# Patient Record
Sex: FEMALE | Race: WHITE | URBAN - METROPOLITAN AREA
[De-identification: names, ages, dates, MRNs, and addresses within clinical notes are randomized per-mention and may not be internally consistent; named-entity substitution may affect disease eponyms.]

---

## 2017-06-15 ENCOUNTER — IMPORTED ENCOUNTER (OUTPATIENT)
Dept: URBAN - METROPOLITAN AREA CLINIC 50 | Facility: CLINIC | Age: 67
End: 2017-06-15

## 2017-07-27 ENCOUNTER — IMPORTED ENCOUNTER (OUTPATIENT)
Dept: URBAN - METROPOLITAN AREA CLINIC 50 | Facility: CLINIC | Age: 67
End: 2017-07-27

## 2018-08-22 ENCOUNTER — IMPORTED ENCOUNTER (OUTPATIENT)
Dept: URBAN - METROPOLITAN AREA CLINIC 50 | Facility: CLINIC | Age: 68
End: 2018-08-22

## 2018-08-22 NOTE — PATIENT DISCUSSION
""""" ""Informed patient that their cataract is visually significant and meets the criteria for cataract surgery to increase their vision and decrease their glare symptoms.  RBALs of surgery discussed

## 2019-08-21 ENCOUNTER — IMPORTED ENCOUNTER (OUTPATIENT)
Dept: URBAN - METROPOLITAN AREA CLINIC 50 | Facility: CLINIC | Age: 69
End: 2019-08-21

## 2020-08-27 ENCOUNTER — IMPORTED ENCOUNTER (OUTPATIENT)
Dept: URBAN - METROPOLITAN AREA CLINIC 50 | Facility: CLINIC | Age: 70
End: 2020-08-27

## 2020-10-16 ENCOUNTER — IMPORTED ENCOUNTER (OUTPATIENT)
Dept: URBAN - METROPOLITAN AREA CLINIC 50 | Facility: CLINIC | Age: 70
End: 2020-10-16

## 2020-11-11 ENCOUNTER — IMPORTED ENCOUNTER (OUTPATIENT)
Dept: URBAN - METROPOLITAN AREA CLINIC 50 | Facility: CLINIC | Age: 70
End: 2020-11-11

## 2020-11-19 ENCOUNTER — IMPORTED ENCOUNTER (OUTPATIENT)
Dept: URBAN - METROPOLITAN AREA CLINIC 50 | Facility: CLINIC | Age: 70
End: 2020-11-19

## 2020-11-19 NOTE — PATIENT DISCUSSION
"""S/P IOL OD: Tecnis ZCB00 22.0 (Target: [place]) +Omidria. Continue post operative instructions and drops per schedule.  """

## 2020-11-25 ENCOUNTER — IMPORTED ENCOUNTER (OUTPATIENT)
Dept: URBAN - METROPOLITAN AREA CLINIC 50 | Facility: CLINIC | Age: 70
End: 2020-11-25

## 2020-11-25 NOTE — PATIENT DISCUSSION
"""S/P IOL OD: Tecnis ZCB00 22.0 (Target: [place]) +Omidria. Continue post operative instructions and drops per schedule.  """

## 2020-12-03 ENCOUNTER — IMPORTED ENCOUNTER (OUTPATIENT)
Dept: URBAN - METROPOLITAN AREA CLINIC 50 | Facility: CLINIC | Age: 70
End: 2020-12-03

## 2020-12-03 NOTE — PATIENT DISCUSSION
"""S/P IOL OS: Tecnis ZCB00 21.0 (Target: [place]). Continue post operative instructions and drops per schedule.  """

## 2020-12-07 ENCOUNTER — IMPORTED ENCOUNTER (OUTPATIENT)
Dept: URBAN - METROPOLITAN AREA CLINIC 50 | Facility: CLINIC | Age: 70
End: 2020-12-07

## 2020-12-07 NOTE — PATIENT DISCUSSION
"""S/P IOL OS: Tecnis ZCB00 21.0 (Target: [place]). Continue post operative instructions and drops per schedule.  """

## 2021-01-04 ENCOUNTER — IMPORTED ENCOUNTER (OUTPATIENT)
Dept: URBAN - METROPOLITAN AREA CLINIC 50 | Facility: CLINIC | Age: 71
End: 2021-01-04

## 2021-01-04 NOTE — PATIENT DISCUSSION
"""S/P IOL OU: OD: Tecnis ZCB00 22.0 (Target: [place]). OS: Tecnis ZCB00 21.0 (Target: [place]). "

## 2021-01-26 ENCOUNTER — IMPORTED ENCOUNTER (OUTPATIENT)
Dept: URBAN - METROPOLITAN AREA CLINIC 50 | Facility: CLINIC | Age: 71
End: 2021-01-26

## 2021-01-27 ENCOUNTER — IMPORTED ENCOUNTER (OUTPATIENT)
Dept: URBAN - METROPOLITAN AREA CLINIC 50 | Facility: CLINIC | Age: 71
End: 2021-01-27

## 2021-02-17 ENCOUNTER — IMPORTED ENCOUNTER (OUTPATIENT)
Dept: URBAN - METROPOLITAN AREA CLINIC 50 | Facility: CLINIC | Age: 71
End: 2021-02-17

## 2021-02-18 ENCOUNTER — IMPORTED ENCOUNTER (OUTPATIENT)
Dept: URBAN - METROPOLITAN AREA CLINIC 50 | Facility: CLINIC | Age: 71
End: 2021-02-18

## 2021-03-03 ENCOUNTER — IMPORTED ENCOUNTER (OUTPATIENT)
Dept: URBAN - METROPOLITAN AREA CLINIC 50 | Facility: CLINIC | Age: 71
End: 2021-03-03

## 2021-03-03 NOTE — PATIENT DISCUSSION
"""Yag Cap left eye performed today with signed consent."" ""Start Prednisolone Acetate left eye twice a day

## 2021-04-17 ASSESSMENT — TONOMETRY
OS_IOP_MMHG: 12
OS_IOP_MMHG: 11
OS_IOP_MMHG: 12
OD_IOP_MMHG: 12
OS_IOP_MMHG: 12
OS_IOP_MMHG: 13
OD_IOP_MMHG: 10
OS_IOP_MMHG: 12
OS_IOP_MMHG: 13
OS_IOP_MMHG: 12
OD_IOP_MMHG: 14
OD_IOP_MMHG: 12
OD_IOP_MMHG: 10
OD_IOP_MMHG: 11
OD_IOP_MMHG: 12
OD_IOP_MMHG: 12
OD_IOP_MMHG: 10
OD_IOP_MMHG: 14
OS_IOP_MMHG: 15
OS_IOP_MMHG: 12
OD_IOP_MMHG: 13
OS_IOP_MMHG: 13
OS_IOP_MMHG: 10
OS_IOP_MMHG: 12
OD_IOP_MMHG: 12
OD_IOP_MMHG: 12

## 2021-04-17 ASSESSMENT — VISUAL ACUITY
OS_BAT: 20/60
OS_CC: 20/30+1
OD_OTHER: 20/40-. 20/80-.
OS_SC: 20/30-1
OD_OTHER: 20/50. 20/60.
OD_CC: J2
OS_OTHER: 20/40. 20/80-.
OD_SC: 20/25+2
OS_SC: 20/30
OS_BAT: 20/50
OD_SC: 20/40+2
OD_OTHER: 20/20. 20/20.
OD_CC: 20/30-
OS_SC: 20/20-2
OS_CC: J2
OS_CC: 20/30-
OD_OTHER: 20/60. 20/100.
OD_BAT: 20/50
OS_PH: 20/20-1
OS_SC: 20/30
OD_OTHER: 20/50. 20/80.
OD_CC: 20/25-
OD_BAT: 20/50
OS_CC: 20/20-
OD_CC: 20/20
OD_CC: 20/20
OS_OTHER: 20/60. 20/60.
OS_SC: 20/20-2
OS_CC: J1+
OS_CC: J2
OS_OTHER: 20/25. 20/30.
OD_BAT: 20/20
OD_SC: 20/20-1
OD_PH: 20/25-2
OS_CC: J1+
OD_CC: J2
OD_BAT: 20/40-
OS_BAT: 20/50
OS_SC: 20/40-1
OD_PH: 20/25-1
OD_BAT: 20/60
OS_CC: 20/25
OS_BAT: 20/60
OS_CC: 20/25-2
OD_CC: J1+
OD_SC: 20/30-1
OS_OTHER: 20/60. 20/60.
OS_CC: 20/50
OS_CC: J1+
OS_BAT: 20/25
OS_OTHER: 20/50. 20/70.
OS_OTHER: 20/50. 20/50.
OD_CC: J1+
OD_OTHER: 20/60. 20/100.
OS_OTHER: 20/60. 20/200.
OS_OTHER: 20/60. 20/80.
OD_BAT: 20/50
OD_CC: 20/25-2
OD_SC: 20/20
OS_CC: 20/25-
OD_BAT: 20/60
OS_BAT: 20/60
OS_PH: 20/25
OS_BAT: 20/60
OS_BAT: 20/40
OD_SC: 20/40
OD_CC: J1+
OD_OTHER: 20/50. 20/50.
OD_SC: 20/40
OD_CC: 20/25-